# Patient Record
Sex: FEMALE | Race: AMERICAN INDIAN OR ALASKA NATIVE | NOT HISPANIC OR LATINO | URBAN - METROPOLITAN AREA
[De-identification: names, ages, dates, MRNs, and addresses within clinical notes are randomized per-mention and may not be internally consistent; named-entity substitution may affect disease eponyms.]

---

## 2019-10-03 ENCOUNTER — EMERGENCY (EMERGENCY)
Facility: HOSPITAL | Age: 19
LOS: 0 days | Discharge: HOME | End: 2019-10-03
Attending: EMERGENCY MEDICINE | Admitting: EMERGENCY MEDICINE
Payer: SUBSIDIZED

## 2019-10-03 VITALS
HEART RATE: 82 BPM | TEMPERATURE: 98 F | SYSTOLIC BLOOD PRESSURE: 130 MMHG | WEIGHT: 115.08 LBS | OXYGEN SATURATION: 100 % | DIASTOLIC BLOOD PRESSURE: 65 MMHG | RESPIRATION RATE: 18 BRPM

## 2019-10-03 DIAGNOSIS — H53.8 OTHER VISUAL DISTURBANCES: ICD-10-CM

## 2019-10-03 DIAGNOSIS — H57.11 OCULAR PAIN, RIGHT EYE: ICD-10-CM

## 2019-10-03 DIAGNOSIS — H57.10 OCULAR PAIN, UNSPECIFIED EYE: ICD-10-CM

## 2019-10-03 PROCEDURE — 99283 EMERGENCY DEPT VISIT LOW MDM: CPT

## 2019-10-03 PROCEDURE — 99053 MED SERV 10PM-8AM 24 HR FAC: CPT

## 2019-10-03 RX ORDER — OFLOXACIN 0.3 %
2 DROPS OPHTHALMIC (EYE)
Qty: 2 | Refills: 0
Start: 2019-10-03 | End: 2019-10-09

## 2019-10-03 NOTE — ED PROVIDER NOTE - OBJECTIVE STATEMENT
pt is a 18 yof w/ no pmhx here for R eye pain for the last week. pt states eye has gotten progressively more erythematous and painful. Last night, pt noticed purulent discharge so she came in. denies dizziness, n/v, abd pain, diarrhea

## 2019-10-03 NOTE — ED PROVIDER NOTE - NS ED ROS FT
Constitutional:  See HPI  Eyes:  +decreased vision, + R eye pain  ENMT: No neck pain or stiffness  Cardiac:  No chest pain  Respiratory:  No cough or respiratory distress.   GI:  No nausea, vomiting, diarrhea or abdominal pain.  :  No dysuria, frequency or burning.  MS:  No back pain.  Neuro:  No headache   Skin:  No skin rash  Except as documented in the HPI,  all other systems are negative

## 2019-10-03 NOTE — ED PROVIDER NOTE - CLINICAL SUMMARY MEDICAL DECISION MAKING FREE TEXT BOX
Imp: ?corneal ulcer vs abrasion. No foreign body.  A/P: Ofloxacin drops. Will f/u with ophthalmologist Dr. Cruz.

## 2019-10-03 NOTE — ED PROVIDER NOTE - PHYSICAL EXAMINATION
CONSTITUTIONAL: Well-developed; well-nourished; in no acute distress.   SKIN: warm, dry  HEAD: Normocephalic; atraumatic.  EYES: PERRL, EOMI, normal sclera and conjunctiva. vision 20/50 in R eye, 20/40 in L eye  ENT: No nasal discharge; airway clear.  NECK: Supple; non tender.  ABD: soft ntnd  EXT: Normal ROM.  No clubbing, cyanosis or edema.   LYMPH: No acute cervical adenopathy.  NEURO: Alert, oriented, grossly unremarkable  PSYCH: Cooperative, appropriate.

## 2019-10-03 NOTE — ED PEDIATRIC NURSE NOTE - NSIMPLEMENTINTERV_GEN_ALL_ED
Implemented All Universal Safety Interventions:  [place] to call system. Call bell, personal items and telephone within reach. Instruct patient to call for assistance. Room bathroom lighting operational. Non-slip footwear when patient is off stretcher. Physically safe environment: no spills, clutter or unnecessary equipment. Stretcher in lowest position, wheels locked, appropriate side rails in place.

## 2019-10-03 NOTE — ED PROVIDER NOTE - NSFOLLOWUPINSTRUCTIONS_ED_ALL_ED_FT
Eye Pain    WHAT YOU NEED TO KNOW:    Eye pain may be caused by a problem within your eye. A problem or condition in another body area can also cause pain that travels to your eye. Some causes of eye pain include dry eyes, inflammation, a sinus infection, or a cluster headache.    DISCHARGE INSTRUCTIONS:    Medicines: You may need any of the following:     Artificial tears are eyedrops that can help moisturize your eyes and relieve your pain. Ask your healthcare provider how often to use artificial tears.       NSAIDs, such as ibuprofen, help decrease swelling, pain, and fever. This medicine is available with or without a doctor's order. NSAIDs can cause stomach bleeding or kidney problems in certain people. If you take blood thinner medicine, always ask if NSAIDs are safe for you. Always read the medicine label and follow directions. Do not give these medicines to children under 6 months of age without direction from your child's healthcare provider.      Take your medicine as directed. Contact your healthcare provider if you think your medicine is not helping or if you have side effects. Tell him of her if you are allergic to any medicine. Keep a list of the medicines, vitamins, and herbs you take. Include the amounts, and when and why you take them. Bring the list or the pill bottles to follow-up visits. Carry your medicine list with you in case of an emergency.    Follow up with your healthcare provider as directed: You may be referred to an eye specialist. Write down your questions so you remember to ask them during your visits.    Contact your healthcare provider if:     You have a fever.       Your eye pain gets worse when you move your eyes.       You have questions or concerns about your condition or care.    Return to the emergency department if:     You have any vision loss.       You have sudden vision changes such as blurred vision, double vision, or seeing halos around lights.       You develop severe eye pain.          © Copyright [employer] 2019 All illustrations and images included in CareNotes are the copyrighted property of A.D.A.M., Inc. or [employer].

## 2019-10-03 NOTE — ED PEDIATRIC NURSE NOTE - OBJECTIVE STATEMENT
Patient present to ED with complains of right eye pain x 1 week with redness, swelling and purulent discharge, denies fever, chills, nausea, vomiting, and dizziness.

## 2019-10-03 NOTE — ED PEDIATRIC TRIAGE NOTE - AS O2 DELIVERY
Melissa Monge  [facility] [facility] [place] [address]           T [phone]               04/04/19    Barbara Diaz  [id number] [facility] [address]      To Whom It May Concern:      Pleases excuse patient from school the morning of 4/4/2019.    Thank you,        SIGNATURE:____________________________________________________                                                                 Leslie Wheatley NP               
room air

## 2019-10-03 NOTE — ED PROVIDER NOTE - PATIENT PORTAL LINK FT
You can access the FollowMyHealth Patient Portal offered by [facility] by registering at the following website: [email or web address]. By joining [employer]’s FollowMyHealth portal, you will also be able to view your health information using other applications (apps) compatible with our system.

## 2019-10-03 NOTE — ED PROVIDER NOTE - PROGRESS NOTE DETAILS
ATTENDING NOTE: 17 y/o female with no significant PMH, c/o pain to right eye x 1 week. No clear inciting event. States she always has eye pain but now it is worse. No blurry vision. No fever. No H/A, no neck pain. Has had vomiting but that preceded the eye pain by several weeks. No hx of trauma. Does wear contact lenses.  O/E: Constitutional: Non-toxic in appearance. Eyes: PERRL. EOMI. Globes intact. No visible foreign body. Lids inverted - no foreign body. Mild injection of conjunctivae. VA 20/50 right eye, 20/40 left eye. Slit lamp exam: no foreign body, anterior chamber quiet. + small area of fluorescein uptake at 6:00 position. IOP 8 in right eye, 8 in left eye. Neck: Supple, no meningeal signs. Neuro: No focal neurologic deficits.   Imp: ?corneal ulcer vs abrasion. No foreign body.  A/P: Ofloxacin drops. Will f/u with ophthalmologist Dr. Cruz. IOP: 8 in R eye, 8 L eye fluoroscein uptake in spot at 6 oclock

## 2019-10-03 NOTE — ED PROVIDER NOTE - CARE PROVIDER_API CALL
Donato Cruz)  Ophthalmology  [address] [place] [address]  Phone: [phone]  Fax: [phone]  Follow Up Time:

## 2021-06-25 ENCOUNTER — EMERGENCY (EMERGENCY)
Facility: HOSPITAL | Age: 21
LOS: 0 days | Discharge: HOME | End: 2021-06-26
Attending: STUDENT IN AN ORGANIZED HEALTH CARE EDUCATION/TRAINING PROGRAM | Admitting: STUDENT IN AN ORGANIZED HEALTH CARE EDUCATION/TRAINING PROGRAM
Payer: COMMERCIAL

## 2021-06-25 VITALS
SYSTOLIC BLOOD PRESSURE: 111 MMHG | RESPIRATION RATE: 17 BRPM | TEMPERATURE: 101 F | HEART RATE: 103 BPM | DIASTOLIC BLOOD PRESSURE: 61 MMHG | OXYGEN SATURATION: 100 %

## 2021-06-25 DIAGNOSIS — R50.9 FEVER, UNSPECIFIED: ICD-10-CM

## 2021-06-25 DIAGNOSIS — Z20.822 CONTACT WITH AND (SUSPECTED) EXPOSURE TO COVID-19: ICD-10-CM

## 2021-06-25 DIAGNOSIS — R11.2 NAUSEA WITH VOMITING, UNSPECIFIED: ICD-10-CM

## 2021-06-25 DIAGNOSIS — R53.1 WEAKNESS: ICD-10-CM

## 2021-06-25 DIAGNOSIS — E86.0 DEHYDRATION: ICD-10-CM

## 2021-06-25 DIAGNOSIS — M79.10 MYALGIA, UNSPECIFIED SITE: ICD-10-CM

## 2021-06-25 LAB
BASOPHILS # BLD AUTO: 0.02 K/UL — SIGNIFICANT CHANGE UP (ref 0–0.2)
BASOPHILS NFR BLD AUTO: 0.2 % — SIGNIFICANT CHANGE UP (ref 0–1)
EOSINOPHIL # BLD AUTO: 0.01 K/UL — SIGNIFICANT CHANGE UP (ref 0–0.7)
EOSINOPHIL NFR BLD AUTO: 0.1 % — SIGNIFICANT CHANGE UP (ref 0–8)
HCT VFR BLD CALC: 39.8 % — SIGNIFICANT CHANGE UP (ref 37–47)
HGB BLD-MCNC: 13.5 G/DL — SIGNIFICANT CHANGE UP (ref 12–16)
IMM GRANULOCYTES NFR BLD AUTO: 0.3 % — SIGNIFICANT CHANGE UP (ref 0.1–0.3)
LYMPHOCYTES # BLD AUTO: 1.09 K/UL — LOW (ref 1.2–3.4)
LYMPHOCYTES # BLD AUTO: 11.6 % — LOW (ref 20.5–51.1)
MCHC RBC-ENTMCNC: 27.8 PG — SIGNIFICANT CHANGE UP (ref 27–31)
MCHC RBC-ENTMCNC: 33.9 G/DL — SIGNIFICANT CHANGE UP (ref 32–37)
MCV RBC AUTO: 82.1 FL — SIGNIFICANT CHANGE UP (ref 81–99)
MONOCYTES # BLD AUTO: 0.98 K/UL — HIGH (ref 0.1–0.6)
MONOCYTES NFR BLD AUTO: 10.5 % — HIGH (ref 1.7–9.3)
NEUTROPHILS # BLD AUTO: 7.23 K/UL — HIGH (ref 1.4–6.5)
NEUTROPHILS NFR BLD AUTO: 77.3 % — HIGH (ref 42.2–75.2)
NRBC # BLD: 0 /100 WBCS — SIGNIFICANT CHANGE UP (ref 0–0)
PLATELET # BLD AUTO: 230 K/UL — SIGNIFICANT CHANGE UP (ref 130–400)
RBC # BLD: 4.85 M/UL — SIGNIFICANT CHANGE UP (ref 4.2–5.4)
RBC # FLD: 12.3 % — SIGNIFICANT CHANGE UP (ref 11.5–14.5)
WBC # BLD: 9.36 K/UL — SIGNIFICANT CHANGE UP (ref 4.8–10.8)
WBC # FLD AUTO: 9.36 K/UL — SIGNIFICANT CHANGE UP (ref 4.8–10.8)

## 2021-06-25 PROCEDURE — 99284 EMERGENCY DEPT VISIT MOD MDM: CPT

## 2021-06-25 RX ORDER — ACETAMINOPHEN 500 MG
975 TABLET ORAL ONCE
Refills: 0 | Status: COMPLETED | OUTPATIENT
Start: 2021-06-25 | End: 2021-06-25

## 2021-06-25 RX ORDER — ONDANSETRON 8 MG/1
4 TABLET, FILM COATED ORAL ONCE
Refills: 0 | Status: COMPLETED | OUTPATIENT
Start: 2021-06-25 | End: 2021-06-25

## 2021-06-25 RX ORDER — SODIUM CHLORIDE 9 MG/ML
1000 INJECTION INTRAMUSCULAR; INTRAVENOUS; SUBCUTANEOUS ONCE
Refills: 0 | Status: COMPLETED | OUTPATIENT
Start: 2021-06-25 | End: 2021-06-25

## 2021-06-25 RX ADMIN — ONDANSETRON 4 MILLIGRAM(S): 8 TABLET, FILM COATED ORAL at 23:09

## 2021-06-25 RX ADMIN — SODIUM CHLORIDE 1000 MILLILITER(S): 9 INJECTION INTRAMUSCULAR; INTRAVENOUS; SUBCUTANEOUS at 23:08

## 2021-06-25 RX ADMIN — Medication 975 MILLIGRAM(S): at 23:02

## 2021-06-26 VITALS
OXYGEN SATURATION: 100 % | DIASTOLIC BLOOD PRESSURE: 56 MMHG | HEART RATE: 91 BPM | RESPIRATION RATE: 18 BRPM | SYSTOLIC BLOOD PRESSURE: 118 MMHG | TEMPERATURE: 99 F

## 2021-06-26 LAB
ALBUMIN SERPL ELPH-MCNC: 4.9 G/DL — SIGNIFICANT CHANGE UP (ref 3.5–5.2)
ALP SERPL-CCNC: 68 U/L — SIGNIFICANT CHANGE UP (ref 30–115)
ALT FLD-CCNC: 13 U/L — SIGNIFICANT CHANGE UP (ref 0–41)
ANION GAP SERPL CALC-SCNC: 12 MMOL/L — SIGNIFICANT CHANGE UP (ref 7–14)
APPEARANCE UR: CLEAR — SIGNIFICANT CHANGE UP
AST SERPL-CCNC: 17 U/L — SIGNIFICANT CHANGE UP (ref 0–41)
BILIRUB SERPL-MCNC: 0.3 MG/DL — SIGNIFICANT CHANGE UP (ref 0.2–1.2)
BILIRUB UR-MCNC: NEGATIVE — SIGNIFICANT CHANGE UP
BUN SERPL-MCNC: 10 MG/DL — SIGNIFICANT CHANGE UP (ref 10–20)
CALCIUM SERPL-MCNC: 9.5 MG/DL — SIGNIFICANT CHANGE UP (ref 8.5–10.1)
CHLORIDE SERPL-SCNC: 98 MMOL/L — SIGNIFICANT CHANGE UP (ref 98–110)
CO2 SERPL-SCNC: 25 MMOL/L — SIGNIFICANT CHANGE UP (ref 17–32)
COLOR SPEC: YELLOW — SIGNIFICANT CHANGE UP
CREAT SERPL-MCNC: 0.6 MG/DL — LOW (ref 0.7–1.5)
DIFF PNL FLD: NEGATIVE — SIGNIFICANT CHANGE UP
GLUCOSE SERPL-MCNC: 87 MG/DL — SIGNIFICANT CHANGE UP (ref 70–99)
GLUCOSE UR QL: NEGATIVE — SIGNIFICANT CHANGE UP
HCG SERPL QL: NEGATIVE — SIGNIFICANT CHANGE UP
KETONES UR-MCNC: ABNORMAL
LACTATE SERPL-SCNC: 1.2 MMOL/L — SIGNIFICANT CHANGE UP (ref 0.7–2)
LEUKOCYTE ESTERASE UR-ACNC: NEGATIVE — SIGNIFICANT CHANGE UP
LIDOCAIN IGE QN: 12 U/L — SIGNIFICANT CHANGE UP (ref 7–60)
MAGNESIUM SERPL-MCNC: 1.9 MG/DL — SIGNIFICANT CHANGE UP (ref 1.8–2.4)
NITRITE UR-MCNC: NEGATIVE — SIGNIFICANT CHANGE UP
PH UR: 6.5 — SIGNIFICANT CHANGE UP (ref 5–8)
POTASSIUM SERPL-MCNC: 4 MMOL/L — SIGNIFICANT CHANGE UP (ref 3.5–5)
POTASSIUM SERPL-SCNC: 4 MMOL/L — SIGNIFICANT CHANGE UP (ref 3.5–5)
PROT SERPL-MCNC: 8 G/DL — SIGNIFICANT CHANGE UP (ref 6–8)
PROT UR-MCNC: SIGNIFICANT CHANGE UP
RAPID RVP RESULT: SIGNIFICANT CHANGE UP
SARS-COV-2 RNA SPEC QL NAA+PROBE: SIGNIFICANT CHANGE UP
SODIUM SERPL-SCNC: 135 MMOL/L — SIGNIFICANT CHANGE UP (ref 135–146)
SP GR SPEC: 1.02 — SIGNIFICANT CHANGE UP (ref 1.01–1.03)
UROBILINOGEN FLD QL: SIGNIFICANT CHANGE UP

## 2021-06-26 RX ORDER — ONDANSETRON 8 MG/1
1 TABLET, FILM COATED ORAL
Qty: 3 | Refills: 0
Start: 2021-06-26

## 2021-06-26 NOTE — ED PROVIDER NOTE - OBJECTIVE STATEMENT
20y F no ppmh presents for eval of weakness. Pt has fever x1 day with associated body aches, fever, nbnb n/v, mild relief from motrin, no aggravating factors. Denies ha, cp, sob, cough, abd pain, numbness, dysuria, hematuria

## 2021-06-26 NOTE — ED PROVIDER NOTE - CLINICAL SUMMARY MEDICAL DECISION MAKING FREE TEXT BOX
Previously healthy 20F p/w viral Sx, myalgias, n/v, fever x1 day. On arrival temp 100.9 oral, , pt not ill appearing, Gen - NAD, Head - NCAT, Pharynx - clear, MMM, Heart - RRR, no m/g/r, Lungs - CTAB, no w/c/r, Abdomen - soft, NT, ND, Skin - No rash, Extremities - FROM, no edema, erythema, ecchymosis, brisk cap refill, Neuro - A&O x3, equal strength and sensation, non-focal exam. Labs reviewed, IVF and antipyretics given. On reassessment pt well appearing, tolerated PO and feels well enough for d/c I have fully discussed the medical management and delivery of care with the patient. I have discussed any available labs, imaging and treatment options with the patient. All Questions answered at the bedside and printed copies of all results provided and recommended to review with PCP. Patient confirms understanding and has been given detailed return precautions. Patient instructed to return to the ED should symptoms persist or worsen. Patient has demonstrated capacity and has verbalized understanding. Patient is well appearing upon discharge, ambulatory with a steady gait.

## 2021-06-26 NOTE — ED PROVIDER NOTE - NS ED ROS FT
Constitutional: (+) fever, (+) fatigue  Eyes/ENT: (-) blurry vision, (-) epistaxis  Cardiovascular: (-) chest pain, (-) syncope  Respiratory: (-) cough, (-) shortness of breath  Gastrointestinal: (+) vomiting, (-) diarrhea  : (-) dysuria, (-) hematuria  Musculoskeletal: (-) neck pain, (-) back pain, (-) joint pain  Integumentary: (-) rash, (-) edema  Neurological: (-) headache, (-) altered mental status  Allergic/Immunologic: (-) pruritus

## 2021-06-26 NOTE — ED PROVIDER NOTE - PHYSICAL EXAMINATION
CONST: NAD  EYES: Sclera and conjunctiva clear.   ENT: Oropharynx mild erythema. No abscess or swelling. Uvula midline. No nasal discharge.   NECK: Non-tender, no meningeal signs. normal ROM. supple   CARD: S1 S2; No jvd  RESP: Equal BS B/L, No wheezes, rhonchi or rales. No distress  GI: Soft, non-tender, non-distended. no cva tenderness. normal BS  MS: Normal ROM in all extremities. pulses 2 +. no calf tenderness or swelling  SKIN: Warm, dry, no acute rashes. Good turgor  NEURO: A&Ox3, No focal deficits. Strength 5/5 with no sensory deficits. Steady gait.

## 2021-06-26 NOTE — ED PROVIDER NOTE - PATIENT PORTAL LINK FT
You can access the FollowMyHealth Patient Portal offered by [facility] by registering at the following website: [email or web address]. By joining [employer]’s FollowMyHealth portal, you will also be able to view your health information using other applications (apps) compatible with our system.

## 2021-06-26 NOTE — ED PROVIDER NOTE - PMH
No pertinent past medical history   No pertinent past medical history     <<----- Click to add NO pertinent Past Medical History

## 2021-06-27 LAB
CULTURE RESULTS: SIGNIFICANT CHANGE UP
SPECIMEN SOURCE: SIGNIFICANT CHANGE UP

## 2022-10-05 NOTE — ED ADULT NURSE NOTE - NS ED NURSE DC INFO COMPLEXITY
What Is The Reason For Today's Visit?: History of Melanoma Year Excised?: 2016 [place] Depth?: Unknown Simple: Patient demonstrates quick and easy understanding

## 2022-11-04 ENCOUNTER — INPATIENT (INPATIENT)
Facility: HOSPITAL | Age: 22
LOS: 0 days | Discharge: HOME | End: 2022-11-05
Attending: INTERNAL MEDICINE | Admitting: INTERNAL MEDICINE
Payer: COMMERCIAL

## 2022-11-04 VITALS
OXYGEN SATURATION: 99 % | SYSTOLIC BLOOD PRESSURE: 120 MMHG | TEMPERATURE: 98 F | RESPIRATION RATE: 18 BRPM | WEIGHT: 119.93 LBS | HEART RATE: 107 BPM | DIASTOLIC BLOOD PRESSURE: 78 MMHG

## 2022-11-04 LAB
ALBUMIN SERPL ELPH-MCNC: 4.7 G/DL — SIGNIFICANT CHANGE UP (ref 3.5–5.2)
ALP SERPL-CCNC: 63 U/L — SIGNIFICANT CHANGE UP (ref 30–115)
ALT FLD-CCNC: 12 U/L — SIGNIFICANT CHANGE UP (ref 0–41)
ANION GAP SERPL CALC-SCNC: 12 MMOL/L — SIGNIFICANT CHANGE UP (ref 7–14)
AST SERPL-CCNC: 18 U/L — SIGNIFICANT CHANGE UP (ref 0–41)
BASOPHILS # BLD AUTO: 0.03 K/UL — SIGNIFICANT CHANGE UP (ref 0–0.2)
BASOPHILS NFR BLD AUTO: 0.3 % — SIGNIFICANT CHANGE UP (ref 0–1)
BILIRUB SERPL-MCNC: <0.2 MG/DL — SIGNIFICANT CHANGE UP (ref 0.2–1.2)
BUN SERPL-MCNC: 16 MG/DL — SIGNIFICANT CHANGE UP (ref 10–20)
CALCIUM SERPL-MCNC: 9 MG/DL — SIGNIFICANT CHANGE UP (ref 8.4–10.5)
CHLORIDE SERPL-SCNC: 100 MMOL/L — SIGNIFICANT CHANGE UP (ref 98–110)
CO2 SERPL-SCNC: 23 MMOL/L — SIGNIFICANT CHANGE UP (ref 17–32)
CREAT SERPL-MCNC: 0.5 MG/DL — LOW (ref 0.7–1.5)
EGFR: 136 ML/MIN/1.73M2 — SIGNIFICANT CHANGE UP
EOSINOPHIL # BLD AUTO: 0.09 K/UL — SIGNIFICANT CHANGE UP (ref 0–0.7)
EOSINOPHIL NFR BLD AUTO: 0.9 % — SIGNIFICANT CHANGE UP (ref 0–8)
GLUCOSE SERPL-MCNC: 119 MG/DL — HIGH (ref 70–99)
HCG SERPL QL: NEGATIVE — SIGNIFICANT CHANGE UP
HCT VFR BLD CALC: 36.9 % — LOW (ref 37–47)
HGB BLD-MCNC: 12.7 G/DL — SIGNIFICANT CHANGE UP (ref 12–16)
IMM GRANULOCYTES NFR BLD AUTO: 0.3 % — SIGNIFICANT CHANGE UP (ref 0.1–0.3)
LACTATE SERPL-SCNC: 1.8 MMOL/L — SIGNIFICANT CHANGE UP (ref 0.7–2)
LIDOCAIN IGE QN: 24 U/L — SIGNIFICANT CHANGE UP (ref 7–60)
LYMPHOCYTES # BLD AUTO: 3.54 K/UL — HIGH (ref 1.2–3.4)
LYMPHOCYTES # BLD AUTO: 34.1 % — SIGNIFICANT CHANGE UP (ref 20.5–51.1)
MCHC RBC-ENTMCNC: 28.6 PG — SIGNIFICANT CHANGE UP (ref 27–31)
MCHC RBC-ENTMCNC: 34.4 G/DL — SIGNIFICANT CHANGE UP (ref 32–37)
MCV RBC AUTO: 83.1 FL — SIGNIFICANT CHANGE UP (ref 81–99)
MONOCYTES # BLD AUTO: 0.63 K/UL — HIGH (ref 0.1–0.6)
MONOCYTES NFR BLD AUTO: 6.1 % — SIGNIFICANT CHANGE UP (ref 1.7–9.3)
NEUTROPHILS # BLD AUTO: 6.06 K/UL — SIGNIFICANT CHANGE UP (ref 1.4–6.5)
NEUTROPHILS NFR BLD AUTO: 58.3 % — SIGNIFICANT CHANGE UP (ref 42.2–75.2)
NRBC # BLD: 0 /100 WBCS — SIGNIFICANT CHANGE UP (ref 0–0)
PLATELET # BLD AUTO: 274 K/UL — SIGNIFICANT CHANGE UP (ref 130–400)
POTASSIUM SERPL-MCNC: 4.5 MMOL/L — SIGNIFICANT CHANGE UP (ref 3.5–5)
POTASSIUM SERPL-SCNC: 4.5 MMOL/L — SIGNIFICANT CHANGE UP (ref 3.5–5)
PROT SERPL-MCNC: 7.4 G/DL — SIGNIFICANT CHANGE UP (ref 6–8)
RBC # BLD: 4.44 M/UL — SIGNIFICANT CHANGE UP (ref 4.2–5.4)
RBC # FLD: 12.3 % — SIGNIFICANT CHANGE UP (ref 11.5–14.5)
SARS-COV-2 RNA SPEC QL NAA+PROBE: SIGNIFICANT CHANGE UP
SODIUM SERPL-SCNC: 135 MMOL/L — SIGNIFICANT CHANGE UP (ref 135–146)
WBC # BLD: 10.38 K/UL — SIGNIFICANT CHANGE UP (ref 4.8–10.8)
WBC # FLD AUTO: 10.38 K/UL — SIGNIFICANT CHANGE UP (ref 4.8–10.8)

## 2022-11-04 PROCEDURE — 99284 EMERGENCY DEPT VISIT MOD MDM: CPT

## 2022-11-04 PROCEDURE — 99282 EMERGENCY DEPT VISIT SF MDM: CPT

## 2022-11-04 PROCEDURE — 74177 CT ABD & PELVIS W/CONTRAST: CPT | Mod: 26,MA

## 2022-11-04 RX ORDER — SODIUM CHLORIDE 9 MG/ML
1000 INJECTION INTRAMUSCULAR; INTRAVENOUS; SUBCUTANEOUS ONCE
Refills: 0 | Status: COMPLETED | OUTPATIENT
Start: 2022-11-04 | End: 2022-11-04

## 2022-11-04 RX ORDER — DIATRIZOATE MEGLUMINE 180 MG/ML
30 INJECTION, SOLUTION INTRAVESICAL ONCE
Refills: 0 | Status: COMPLETED | OUTPATIENT
Start: 2022-11-04 | End: 2022-11-04

## 2022-11-04 RX ORDER — MORPHINE SULFATE 50 MG/1
4 CAPSULE, EXTENDED RELEASE ORAL ONCE
Refills: 0 | Status: DISCONTINUED | OUTPATIENT
Start: 2022-11-04 | End: 2022-11-04

## 2022-11-04 RX ORDER — ONDANSETRON 8 MG/1
4 TABLET, FILM COATED ORAL ONCE
Refills: 0 | Status: COMPLETED | OUTPATIENT
Start: 2022-11-04 | End: 2022-11-04

## 2022-11-04 RX ADMIN — DIATRIZOATE MEGLUMINE 30 MILLILITER(S): 180 INJECTION, SOLUTION INTRAVESICAL at 21:05

## 2022-11-04 RX ADMIN — MORPHINE SULFATE 4 MILLIGRAM(S): 50 CAPSULE, EXTENDED RELEASE ORAL at 21:05

## 2022-11-04 RX ADMIN — SODIUM CHLORIDE 2000 MILLILITER(S): 9 INJECTION INTRAMUSCULAR; INTRAVENOUS; SUBCUTANEOUS at 21:06

## 2022-11-04 RX ADMIN — ONDANSETRON 4 MILLIGRAM(S): 8 TABLET, FILM COATED ORAL at 21:06

## 2022-11-04 NOTE — ED PROVIDER NOTE - PROGRESS NOTE DETAILS
Consulted surgery on call, pending their evaluation and care. Pt has been evaluated by surgery and cleared. Pt has been reevaluated and still in pain after morphine. Will admitted for pain control.  Pt is aware of the plan an agrees. Pt has been endorsed to MAR.

## 2022-11-04 NOTE — ED PROVIDER NOTE - CLINICAL SUMMARY MEDICAL DECISION MAKING FREE TEXT BOX
Patient remained hemodynamically stable in ED. Patient abdominal pain continued, will admit to medicine for serial abd exams, symptomatic treatment.   Patient remained hemodynamically stable during the course of ED stay. Discussed with patient about the results of the diagnostic studies. Discussed with admitting physician and MAR, patient is admitted to Medicine for further evaluation and care.

## 2022-11-04 NOTE — ED PROVIDER NOTE - OBJECTIVE STATEMENT
21-year-old female with past medical history of umbilical hernia who presents with periumbilical abdominal pain.  Reports that symptoms started 4 days ago; pain is constant, sharp, and there is no alleviating or worsening factor. Also endorses fever of 101 and patient was taken around 5:30 PM tonight.  Also endorses nausea, but no vomiting.  Denies chest pain, shortness breath, vomiting, hematuria, dysuria, and change in bowel movement.

## 2022-11-04 NOTE — ED PROVIDER NOTE - PROGRESS NOTE
2022    TELEHEALTH EVALUATION -- Audio/Visual (During [id number] public health emergency)    HPI:    Brandon Mariscal (:  2000) has requested an audio/video evaluation for the following concern(s): She has been having sore throat ongoing since 3 days and its been difficult to swallow. Mom notices blisters over her tonsils. Denies any concerns of COVID as they tested negative  She has been only taking fluids. She has nasal congestion. 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 But she has been managing it    She is on birth control and has been following up with endocrinologist at [facility] for hypogonadism. Review of Systems:  Gen:  Denies fever, chills, headaches. No weight loss  HEENT: As mentioned above  CV:  Denies chest pain or tightness, palpitations. Pulm:  Denies shortness of breath, cough. Abd:  Denies abdominal pain, change in bowel habits. Prior to Visit Medications    Medication Sig Taking?  Authorizing Provider   amoxicillin (AMOXIL) 400 MG/5ML suspension Take 6.3 mLs by mouth 2 times daily for 10 days Yes Behzad Naqvi MD   omeprazole (PRILOSEC) 10 MG delayed release capsule TAKE 1 CAPSULE ONE TIME EVERY NIGHT Yes Behzad Naqvi MD   escitalopram (LEXAPRO) 20 MG tablet Take 20 mg by mouth daily Yes Historical Provider, MD   escitalopram (LEXAPRO) 5 MG tablet Take 5 mg by mouth daily Yes Historical Provider, MD   escitalopram (LEXAPRO) 10 MG tablet Take 10 mg by mouth daily Yes Historical Provider, MD   hydrOXYzine (ATARAX) 10 MG tablet Take 10 mg by mouth 3 times daily as needed for Anxiety Unsure of dosage  prn Yes Historical Provider, MD   levothyroxine (SYNTHROID) 137 MCG tablet  Yes Behzad Naqvi MD   fluticasone (FLONASE) 50 MCG/ACT nasal spray 2 sprays by Each Nostril route daily Yes Behzad Naqvi MD   polyethylene glycol (GLYCOLAX) 17 GM/SCOOP powder Take 26 g by mouth daily Yes Behzad Naqvi MD Syringe/Needle, Disp, (SYRINGE 3CC/22GX1\") 22G X 1\" 3 ML MISC FOR MIXING DILUENT AND GROWTH HORMONE MEDICATION ONLY Yes Chet Smallwood MD   magnesium hydroxide (MILK OF MAGNESIA) 400 MG/5ML suspension Take 30 mLs by mouth daily as needed for Constipation Yes Chet Smallwood MD   Cholecalciferol (VITAMIN D3) 50 MCG (2000 UT) TABS Take 2,000 Units by mouth Yes Historical Provider, MD   acetaminophen (TYLENOL) 325 MG tablet Take 650 mg by mouth  Yes Historical Provider, MD   estradiol (VIVELLE) 0.1 MG/24HR APPLY ONE PATCH TO SKIN TWICE A WEEK . DO NOT CUT THE PATCH Yes Historical Provider, MD   fluticasone (FLONASE) 50 MCG/ACT nasal spray PLACE TWO SPRAYS IN EACH NOSTRIL ONCE DAILY Yes Historical Provider, MD   Insulin Syringe-Needle U-100 31G X 5/16\" 0.3 ML MISC Use as directed to administer growth hormone medication daily Yes Historical Provider, MD   Somatropin 5.8 MG SOLR INJECT 0.4MG SUBCUTANEOUSLY ONCE DAILY. DISCARD VIAL 21 DAYS AFTER RECONSTITUTION. REFRIGERATE. Yes Historical Provider, MD       No past medical history on file. No past surgical history on file. Family History   Problem Relation Age of Onset    Other Mother 39        ankylosingspondylitis       Allergies   Allergen Reactions    Azithromycin Diarrhea    Mosquito (Culex Pipiens) Allergy Skin Test      MOSQUITO BITES - Welts       Social History     Tobacco Use    Smoking status: Smoker, Current Status Unknown    Smokeless tobacco: Current    Tobacco comments:     vape stick   Vaping Use    Vaping Use: Every day   Substance Use Topics    Alcohol use: Yes    Drug use: Never          PHYSICAL EXAMINATION:  Vital Signs: (As obtained by patient/caregiver or practitioner observation)  There were no vitals taken for this visit.   Patient-Reported Vitals 9/29/2022   Patient-Reported Weight 179   Patient-Reported Height 4'11\"   Patient-Reported Systolic -   Patient-Reported Diastolic -        Respiratory rate appears Stable. Improved.

## 2022-11-04 NOTE — ED PROVIDER NOTE - PHYSICAL EXAMINATION
CONSTITUTIONAL: in no apparent distress.   HEAD: Normocephalic; atraumatic.   ENT: Hearing is intact with good acuity to spoken voice. Patient is speaking clearly, not muffled and airway is intact.   RESPIRATORY: No signs of respiratory distress. Lung sounds are clear in all lobes bilaterally without rales, rhonchi, or wheezes.  CARDIOVASCULAR: Regular rate and rhythm.   GI: tender to palpation in general abd area. Abdomen is soft, and without distention. Bowel sounds are present and normoactive in all four quadrants. No masses are noted.   BACK: No evidence of trauma or deformity. No CVA tenderness bilaterally. Normal ROM.   NEURO: A & O x 3. Normal speech.   PSYCHOLOGICAL: Appropriate mood and affect. Good judgement and insight.

## 2022-11-04 NOTE — ED PROVIDER NOTE - ATTENDING APP SHARED VISIT CONTRIBUTION OF CARE
Patient is c/o abd pain, generalized abd pain, most severe in the periumbilical area. Denies travel, no sick contacts. +nausea, +fever, and unable to eat/drink anything due to pain.   Vitals reviewed.   Patient is awake, alert, answering questions appropriately, appears uncomfortable, but not in distress.  Lungs: CTA, no wheezing, no crackles.  Abd: +BS, +generalized moderate to severe tenderness, ND, soft, no rebound, no guarding. No hernias noted.   CNS: awake, alert, o x 3, no focal neurologic deficits.  A/P: Abd pain with fever/nausea,   Patient took OTC antipyretics before coming to ED.   labs, IVF,   CT, symptomatic treatment,   reevaluation.
need for outpatient follow-up/lab results/return to ED if symptoms worsen, persist or questions arise/radiology results

## 2022-11-04 NOTE — ED PROVIDER NOTE - NS ED ROS FT
Constitutional: + fever. Negative for chills, and fatigue.  HENT: Negative for headache  Cardiovascular: Negative for chest pain, and palpitation.  Respiratory: Negative for SOB, wheezing, cough and sputum production.  Gastrointestinal: + nausea and abd pain. Negative for vomiting, constipation, diarrhea, hematochezia, and melena.  Genitourinary: Negative for flank pain, dysuria, frequency, and hematuria.  Hematological: Does not bruise/bleed easily.

## 2022-11-04 NOTE — ED ADULT TRIAGE NOTE - GLASGOW COMA SCALE: BEST MOTOR RESPONSE, MLM
68-year-old woman with documented PPH of Bipolar 2 disorder (though questionable if hx is consistent with ahsan/hypomania), 2 prior suicide attempts (via OD), 2 psych admissions (1987, 1992), no h/o substance abuse, recently switched outpatient providers after her psychiatrist of 30 years (Dr. Kong) retired in late 2021, now transferred to [facility] from [facility] for management of depression after being stabilized for AMS after patient overdosed on 90 tabs of Ativan and several tabs of Lamictal and Seroquel iso 1 year of low mood, anxiety, feelings of worthlessness, hopelessness, and overwhelming loneliness. Patient has had ECT in the past, aim is to restart ECT during current hospitalization. Ms. Salazar appears to be unhappy with her new outpatient treatment team, idealizing Dr. Kong and describing her new psychiatrist and therapist as dismissive and uncaring. Ms. Salazar was tearful while sharing her story but expressed a sense of hope that with ECT she will once again feel like herself again.    Ms Salazar lives alone, she is , has no children, and is retired . She still volunteers sometimes in school and identifies her brother as her main source of support.    Pt now s/p 6 ECT sessions (3/18, 3/21, 3/25, 3/28, 3/30, 4/4). Next ECT schdeduled for this Friday 4/8. Remains adherent with medications and denies side effects. Continues to express anxiety about the future but endorses some improvement in hopelessness and negative self-image as patient engages more in psychotherapy. Now discussing future plans (returning home, selling house, looking at assisted living). Continues to seek reassurance frequently from all team members. Tolerating decrease in bedtime benzo dose (decreased 4/1). Planning for family meeting with brother on Thursday.     1. Legal: 9.27 admission  2. Safety: routine observation, pt denies SI/HI/I/P  3. Psychiatric:   -ECT Mon/Fri (2x per week to limit cognitive side effects)  -Continue Lamictal 150mg QHS, hold Lamictal the night before ECT (hold Sun and Thurs evenings)  -Continue Seroquel 200mg QHS  -Continue Ativan taper (last decreased 4/1), Ativan 0.5mg at 12PM, Ativan 0.25mg qhs  -sertraline 150mg, titrate as tolerated  -pantoprazole 6AM Mon/Fri prior to ECT to prevent nausea  4. Medical:   -Continue Synthroid 25mg daily, senna qhs, ASA 81mg daily  -Pt with elevation of TSH to 7.00 (up from 4.05) and bump in LFTs during medical hospitalization; plan to recheck with routine labs  -nicotine patch and PRN nicotine gum for cravings  -constipation, senna qhs, PRN MOM, PRN dulcolax  -monitor BP, pt with low-normal BPs, episode of symptomatic low BP during this admission    3/19 Depressed, tolerated ECT, cont ECT 3/21 Cont other meds  3/20 Depressed anxious, no SI. Cont ECT will communicate above to psychologist  3/21: Patient continues to be mostly withdrawn. Continues to present overly catastrophic and negative thinking. Case discussed with rest of treatment team. Will continue ECT 3x/ week. Will continue titrating Sertraline and tapering Ativan as tolerated.    3/23: Hold ECT for hypotension, pt now with normal vitals.   3/24: Vitals stable. Patient agreeable to discontinue AM ativan dose to continue benzo taper.   3/25: 3rd ECT session  3/28: 4th ECT session, mild disorientation and nausea after procedure; increase sertraline to 150mg  3/30: 5th ECT session, endorses confusion and nausea after procedure  3/31: anxious about ECT side effects, will skip tx tomorrow  4/1: decrease bedtime ativan dose  4/2 Improving with ECT, next 4/4 Will add laxatives, cont to reduce Ativan  4/3 Improved, denies SI. Cont ECT will add Dulcolax dose today can use Dulcolax supp today too. Patient may have used sig  laxatives at home  4/4 6th ECT session, some confusion afterwards  4/5 stable, still anxious about the future but more hopeful, making future plans  4/6: Pt endorses sustained gains in terms of mood and anxiety. Agreed to discontinue nighttime ativan. ECT scheduled for tomorrow morning.   4/8: Pt presents sustained gains in terms of her mood. Utilizing skills learned in CBT to reframe her thoughts. Discharge projected for next week.   4/11: Pt reports sustained gains in terms of mood and anxiety. Will continue ativan taper.  (M6) obeys commands

## 2022-11-05 ENCOUNTER — TRANSCRIPTION ENCOUNTER (OUTPATIENT)
Age: 22
End: 2022-11-05

## 2022-11-05 VITALS
RESPIRATION RATE: 17 BRPM | OXYGEN SATURATION: 97 % | DIASTOLIC BLOOD PRESSURE: 62 MMHG | TEMPERATURE: 98 F | HEART RATE: 80 BPM

## 2022-11-05 LAB
APPEARANCE UR: CLEAR — SIGNIFICANT CHANGE UP
BILIRUB UR-MCNC: NEGATIVE — SIGNIFICANT CHANGE UP
COLOR SPEC: SIGNIFICANT CHANGE UP
DIFF PNL FLD: NEGATIVE — SIGNIFICANT CHANGE UP
GLUCOSE UR QL: NEGATIVE — SIGNIFICANT CHANGE UP
KETONES UR-MCNC: NEGATIVE — SIGNIFICANT CHANGE UP
LEUKOCYTE ESTERASE UR-ACNC: NEGATIVE — SIGNIFICANT CHANGE UP
NITRITE UR-MCNC: NEGATIVE — SIGNIFICANT CHANGE UP
PH UR: 7 — SIGNIFICANT CHANGE UP (ref 5–8)
PROT UR-MCNC: SIGNIFICANT CHANGE UP
SP GR SPEC: >1.05 (ref 1.01–1.03)
UROBILINOGEN FLD QL: SIGNIFICANT CHANGE UP

## 2022-11-05 PROCEDURE — 99222 1ST HOSP IP/OBS MODERATE 55: CPT

## 2022-11-05 RX ORDER — ACETAMINOPHEN 500 MG
1 TABLET ORAL
Qty: 0 | Refills: 0 | DISCHARGE

## 2022-11-05 RX ORDER — KETOROLAC TROMETHAMINE 30 MG/ML
15 SYRINGE (ML) INJECTION ONCE
Refills: 0 | Status: DISCONTINUED | OUTPATIENT
Start: 2022-11-05 | End: 2022-11-05

## 2022-11-05 RX ORDER — FAMOTIDINE 10 MG/ML
20 INJECTION INTRAVENOUS ONCE
Refills: 0 | Status: COMPLETED | OUTPATIENT
Start: 2022-11-05 | End: 2022-11-05

## 2022-11-05 RX ORDER — PANTOPRAZOLE SODIUM 20 MG/1
1 TABLET, DELAYED RELEASE ORAL
Qty: 0 | Refills: 0 | DISCHARGE

## 2022-11-05 RX ADMIN — Medication 15 MILLIGRAM(S): at 02:37

## 2022-11-05 RX ADMIN — FAMOTIDINE 104 MILLIGRAM(S): 10 INJECTION INTRAVENOUS at 02:36

## 2022-11-05 NOTE — H&P ADULT - ATTENDING COMMENTS
PHYSICAL EXAM:  General Appearance: NAD, normal for age and gender. 	  Neck: Normal JVP, no bruit.   Eyes: Conjunctiva clear, Extra Ocular muscles intact. No scleral icterus.  ENMT: Moist oral mucosa. No oral lesion.  Cardiovascular: Regular rate and rhythm S1 S2, No JVD, No murmurs.  Respiratory: Lungs clear to auscultation. No wheezes, rales or rhonchi.  Psychiatry: Alert and oriented x 3, Mood & affect appropriate.  Gastrointestinal:  Soft, Non-distended. [place]-umbilical tenderness. No erythema or drainage from umbilicus.  Neurologic: Non-focal deficits.  Musculoskeletal/ extremities: Normal range of motion, No clubbing, cyanosis or edema.  Vascular: Peripheral pulses palpable bilaterally.  Skin/Integumen: No rashes, No ecchymoses, No cyanosis.    # Paraumbilical pain and tenderness likely secondary to mild omphalitis   - In ED, she was evaluated by Surgery team, was noted to have purulent material and debris expressed upon deep probing of the umbilicus, likely 2/2 omphalitis.  - she remained HD stable with no evidence of Leukocytosis or fever  - CT Abd/Pelvis unremarkable  - pt requesting to be dc home if possible  - recommended patient cleanse the umbilicus with saline or hydrogen peroxide, and keep the umbilicus clean and dry  - will d/c home on 7 day course of Augmentin and to follow surgery recommendation regarding cleaning with hydrogen peroxide    # h/o Gastritis  # h/o Irritable bowel syndrome  - c/w PPI and Dicyclomine    # DVT prophylaxis  - encourage ambulation

## 2022-11-05 NOTE — CONSULT NOTE ADULT - ATTENDING COMMENTS
Patient seen and examined with surgery team  in ED chair and discussed management plans.   LOcal inflammation with omphalitis  ct negative umbilicus cleaned and instrucrted patient. Out patient followup

## 2022-11-05 NOTE — H&P ADULT - NSHPLABSRESULTS_GEN_ALL_CORE
12.7   10.38 )-----------( 274      ( 04 Nov 2022 20:19 )             36.9       11-04    135  |  100  |  16  ----------------------------<  119<H>  4.5   |  23  |  0.5<L>    Ca    9.0      04 Nov 2022 20:19    TPro  7.4  /  Alb  4.7  /  TBili  <0.2  /  DBili  x   /  AST  18  /  ALT  12  /  AlkPhos  63  11-04              Urinalysis Basic - ( 05 Nov 2022 01:00 )    Color: Light Yellow / Appearance: Clear / SG: >1.050 / pH: x  Gluc: x / Ketone: Negative  / Bili: Negative / Urobili: <2 mg/dL   Blood: x / Protein: Trace / Nitrite: Negative   Leuk Esterase: Negative / RBC: x / WBC x   Sq Epi: x / Non Sq Epi: x / Bacteria: x            Lactate Trend  11-04 @ 20:19 Lactate:1.8             CAPILLARY BLOOD GLUCOSE 12.7   10.38 )-----------( 274      ( 04 Nov 2022 20:19 )             36.9       11-04    135  |  100  |  16  ----------------------------<  119<H>  4.5   |  23  |  0.5<L>    Ca    9.0      04 Nov 2022 20:19    TPro  7.4  /  Alb  4.7  /  TBili  <0.2  /  DBili  x   /  AST  18  /  ALT  12  /  AlkPhos  63  11-04        Urinalysis Basic - ( 05 Nov 2022 01:00 )    Color: Light Yellow / Appearance: Clear / SG: >1.050 / pH: x  Gluc: x / Ketone: Negative  / Bili: Negative / Urobili: <2 mg/dL   Blood: x / Protein: Trace / Nitrite: Negative   Leuk Esterase: Negative / RBC: x / WBC x   Sq Epi: x / Non Sq Epi: x / Bacteria: x      Lactate Trend  11-04 @ 20:19 Lactate:1.8     < from: CT Abdomen and Pelvis w/ Oral Cont and w/ IV Cont (11.04.22 @ 23:29) >    IMPRESSION:  1.  Fecalized distal ileum without definite evidence for bowel   obstruction, likely reflecting slow bowel transit.  2.  Appendix is unremarkable.

## 2022-11-05 NOTE — CONSULT NOTE ADULT - ASSESSMENT
ASSESSMENT:  21yF w/ PMHx of  irritable bowel syndrome, ?hernia and gastritis who presented with complaints of epigastric and umbilical pain since yesterday associated with constipation for 2 days. Also of note, on interview, she endorsed 2-3 episodes of emesis per day for the past 2-3 months for which she has been seen and evaluated by an outpatient gastroenterologist. Surgery was consulted to rule out possible umbilical hernia/evaluate abdominal pain.    On physical exam, patient was noted to have purulent material and debris expressed upon deep probing of the umbilicus, likely omphalitis. Further physical exam findings, imaging findings, and labs as documented above.     PLAN:  -No evidence of umbilical hernia or acute surgical pathology on physical exam or imaging  -No acute surgical intervention at this time  -Recommended patient cleanse the umbilicus with Q-tips and saline or hydrogen peroxide, and keep the umbilicus clean and dry  -Dispo per ED     Above plan discussed with Attending Surgeon Dr. Bain, patient, and Primary team  11-05-22 @ 03:54     ASSESSMENT:  21yF w/ PMHx of  irritable bowel syndrome, ?hernia and gastritis who presented with complaints of epigastric and umbilical pain since yesterday associated with constipation for 2 days. Also of note, on interview, she endorsed 2-3 episodes of emesis per day for the past 2-3 months for which she has been seen and evaluated by an outpatient gastroenterologist. Surgery was consulted to rule out possible umbilical hernia/evaluate abdominal pain.    On physical exam, patient was noted to have purulent material and debris expressed upon deep probing of the umbilicus, likely omphalitis. Further physical exam findings, imaging findings, and labs as documented above.     PLAN:  -No evidence of umbilical hernia or acute surgical pathology on physical exam or imaging  -No acute surgical intervention at this time  -Patient seen and evaluated at bedside with Dr. Bain, umbilicus contained purulent debris that was extracted with long q tips  -Recommended patient cleanse the umbilicus with Q-tips and saline or hydrogen peroxide, and keep the umbilicus clean and dry, no further intervention needed   -Dispo per ED     Above plan discussed with Attending Surgeon Dr. Bain, patient, and Primary team  11-05-22 @ 03:54

## 2022-11-05 NOTE — DISCHARGE NOTE PROVIDER - NSDCCPCAREPLAN_GEN_ALL_CORE_FT
PRINCIPAL DISCHARGE DIAGNOSIS  Diagnosis: Acquired omphalitis  Assessment and Plan of Treatment: You came to the hospital for evaluation of abdominal pain around your umbilius, You had CT scan of your abdomen done which was Negative for any acute pathology   You were evaluated by Surgery team and you were found to have   purulent material and debris expressed upon deep probing of the umbilicus, likely due to omphalitis (infection of your umbillicus) possibly due to comlications of naval piercing  CT Abdomen and Pelvis w/ Oral Cont and w/ IV Cont  1.  Fecalized distal ileum without definite evidence for bowel   obstruction, likely reflecting slow bowel transit.  2.  Appendix is unremarkable.  Please complete your course of  Oral Antibiotics as prescribed for 7 days   Please seek medical care if she develops fever/chills/drainge at the site  You are Recommended to cleane the umbilicus with Q-tips and saline or hydrogen peroxide, and keep the umbilicus clean and dry        SECONDARY DISCHARGE DIAGNOSES  Diagnosis: IBS (irritable bowel syndrome)  Assessment and Plan of Treatment: Please continue to take your home medications and follow up with Gasteroenterologist

## 2022-11-05 NOTE — DISCHARGE NOTE PROVIDER - CARE PROVIDER_API CALL
[facility])  Internal Medicine  [address] [place] [address], [address] [place] [address]  Phone: [phone]  Fax: [phone]  Follow Up Time: 1 week

## 2022-11-05 NOTE — DISCHARGE NOTE PROVIDER - ATTENDING DISCHARGE PHYSICAL EXAMINATION:
PHYSICAL EXAM:  General Appearance: NAD, normal for age and gender. 	  Neck: Normal JVP, no bruit.   Eyes: Conjunctiva clear, Extra Ocular muscles intact. No scleral icterus.  ENMT: Moist oral mucosa. No oral lesion.  Cardiovascular: Regular rate and rhythm S1 S2, No JVD, No murmurs.  Respiratory: Lungs clear to auscultation. No wheezes, rales or rhonchi.  Psychiatry: Alert and oriented x 3, Mood & affect appropriate.  Gastrointestinal:  Soft, Non-distended. Mild tenderness around umbilicus but no erythema.  Neurologic: Non-focal deficits.  Musculoskeletal/ extremities: Normal range of motion, No clubbing, cyanosis or edema.  Vascular: Peripheral pulses palpable bilaterally.  Skin/Integumen: No rashes, No ecchymoses, No cyanosis.

## 2022-11-05 NOTE — H&P ADULT - NSHPREVIEWOFSYSTEMS_GEN_ALL_CORE
REVIEW OF SYSTEMS:    CONSTITUTIONAL: No weakness, fevers or chills  EYES/ENT: No visual changes;  No vertigo or throat pain   NECK: No pain or stiffness  RESPIRATORY: No cough, wheezing, hemoptysis; No shortness of breath  CARDIOVASCULAR: No chest pain or palpitations  GASTROINTESTINAL: para umbilical pain 7/10 + nausea, ; No diarrhea or constipation. No melena or hematochezia.  GENITOURINARY: No dysuria, frequency or hematuria  NEUROLOGICAL: No numbness or weakness  SKIN: No itching, rashes

## 2022-11-05 NOTE — DISCHARGE NOTE PROVIDER - NSDCMRMEDTOKEN_GEN_ALL_CORE_FT
Ocuflox 0.3% ophthalmic solution: 2 drop(s) in each affected eye every 4 hours   ondansetron 4 mg oral disintegrating strip: 1 each orally every 8 hours, As Needed -for nausea    amoxicillin-clavulanate 875 mg-125 mg oral tablet: 1 tab(s) orally 2 times a day   dicyclomine 20 mg oral tablet: 1 tab(s) orally 2 times a day  oxycodone-acetaminophen 5 mg-325 mg oral tablet: 1 tab(s) orally 3 times a day -for severe pain MDD:3 tabs  Protonix 40 mg oral delayed release tablet: 1 tab(s) orally once a day  Tylenol 500 mg oral tablet: 1 tab(s) orally 3 times a day, As Needed -moderate pain

## 2022-11-05 NOTE — DISCHARGE NOTE PROVIDER - CARE PROVIDERS DIRECT ADDRESSES
[email or web address]

## 2022-11-05 NOTE — H&P ADULT - ASSESSMENT
21 year old female with PMH of gastritis and irritable bowel syndrome who presented to the ED with complaints of para umbilical pain for 2 days which has been progressively worsening.    #Paraumbilical pain and tenderness likely secondary to Omphalitis   - In ED she was evaluated by Surgery team, was noted to have purulent material and debris expressed upon deep probing of the umbilicus, likely 2/2 omphalitis.  - she remained HD stable with no evidence of Leukocytosis or fever  - CT Abd/Pelvis unremarkable  - Pt requesting to be dc home if possible  - Case discussed with Attending can be dc with PO Abx to complete 7 days with instruction to seek medical care if she develops fever/chills/drainge at the site  - Recommended patient cleanse the umbilicus with Q-tips and saline or hydrogen peroxide, and keep the umbilicus clean and dry    #Hx Gastritis  #Hx of IBS  - c/w PPI and Dicyclomine     DVT Ppx: N/A  GI Ppx: Pantoprazole 40mg PO QD   Diet: Regular  Activity: IAT  CODE: Full Code  Dispo: From Home    MED REC confirmed with pt at bedside   21 year old female with PMH of gastritis and irritable bowel syndrome who presented to the ED with complaints of para umbilical pain for 2 days which has been progressively worsening.    # Paraumbilical pain and tenderness likely secondary to Omphalitis   - In ED she was evaluated by Surgery team, was noted to have purulent material and debris expressed upon deep probing of the umbilicus, likely 2/2 omphalitis.  - she remained HD stable with no evidence of Leukocytosis or fever  - CT Abd/Pelvis unremarkable  - Pt requesting to be dc home if possible  - Case discussed with Attending can be dc with PO Abx to complete 7 days with instruction to seek medical care if she develops fever/chills/drainge at the site  - Recommended patient cleanse the umbilicus with Q-tips and saline or hydrogen peroxide, and keep the umbilicus clean and dry    # Hx Gastritis  # Hx of IBS  - c/w PPI and Dicyclomine     DVT Ppx: N/A  GI Ppx: Pantoprazole 40mg PO QD   Diet: Regular  Activity: IAT  CODE: Full Code  Dispo: From Home    MED REC confirmed with pt at bedside

## 2022-11-05 NOTE — DISCHARGE NOTE PROVIDER - HOSPITAL COURSE
21 year old female with PMH of gastritis and irritable bowel syndrome who presented to the ED with complaints of para umbilical pain for 2 days which has been progressively worsening. She admits to 7/10 nonradiating para umbilical pain, nonradiating and constant in nature exacerbating if touches by her pants. She admits to T 100.1 1x prior to admission which relieved by Motrin. She denies experiencing any similar episodes in the past, however states that she was diagnosed with a hernia (?hiatal) on endoscopy approximately 2-3 years ago. She also admits that she had naval piercing about 5 years ago which complicating with accidental tearing of her skin above her umbilicus and resolved and closed with no intervention. She has been tolerating her diet, last bowel movement today. She was evaluated by a gastroenterologist who prescribed Pantoprazole and Dicyclomine  and recommended a change in diet, which she states has only resulted in minimal improvement of her chronic nausea/vomiting. She denies chills, chest pain, shortness of breath, palpitations, recent infection.    In ED she was evaluated by Surgery team, was noted to have purulent material and debris expressed upon deep probing of the umbilicus, likely 2/2 omphalitis.  In ED she remained HD stable with no evidence of Leukocytosis or fever    CT Abdomen and Pelvis w/ Oral Cont and w/ IV Cont  1.  Fecalized distal ileum without definite evidence for bowel   obstruction, likely reflecting slow bowel transit.  2.  Appendix is unremarkable.      #Paraumbilical pain and tenderness likely secondary to Omphalitis   - In ED she was evaluated by Surgery team, was noted to have purulent material and debris expressed upon deep probing of the umbilicus, likely 2/2 omphalitis.  - she remained HD stable with no evidence of Leukocytosis or fever  - CT Abd/Pelvis unremarkable  - HD stable for dc with PO Abx Augmentin  to complete 7 days with instruction to seek medical care if she develops fever/chills/drainge at the site  - Recommended patient cleanse the umbilicus with Q-tips and saline or hydrogen peroxide, and keep the umbilicus clean and dry    #Hx Gastritis  #Hx of IBS  - c/w PPI and Dicyclomine

## 2022-11-05 NOTE — H&P ADULT - NSHPPHYSICALEXAM_GEN_ALL_CORE
GENERAL: NAD, Resting in bed  HEENT: NCAT  CHEST/LUNG: Clear to auscultation bilaterally; No wheezing or rubs.   HEART: Regular rate and rhythm; No murmurs, rubs, or gallops  ABDOMEN: Bowel sounds present; Soft, Mildly tender around umbilicus, Nondistended.   EXTREMITIES:  No clubbing, cyanosis, or edema  NERVOUS SYSTEM:  Alert & Oriented X3  MSK: FROM all 4 extremities, full and equal strength  SKIN: No rashes or lesions

## 2022-11-05 NOTE — CONSULT NOTE ADULT - SUBJECTIVE AND OBJECTIVE BOX
GENERAL SURGERY CONSULT NOTE    Patient: GREER FREEMAN , 21y ([birth year])Female   MRN: [id number]  Location: [facility] 015 A  Visit: 11-05-22 Inpatient  Date: 11-05-22 @ 03:54    HPI: 21 year old female with past medical history significant for gastritis and irritable bowel syndrome who presented to the ED with complaints of epigastric and umbilical pain since yesterday which has been progressively worsening. She denies experiencing any similar episodes in the past, however states that she was diagnosed with a hernia (?hiatal) on endoscopy approximately 2-3 years ago. She has been tolerating her diet, last bowel movement was 2 days ago, and she endorses 2-3 episodes daily for the past 2-3 months which she attributes to her gastritis. She was evaluated by a gastroenterologist who prescribed Pantoprazole and recommended a change in diet, which she states has only resulted in minimal improvement. She denies fever, chills, chest pain, shortness of breath, palpitations, recent infection.    PAST MEDICAL & SURGICAL HISTORY:  Irritable Bowel Syndrome  Gastritis  No significant past surgical history    Home Medications:  Pantoprazole  Dicyclomine     VITALS:  T(F): 98.3 (11-04-22 @ 18:47), Max: 98.3 (11-04-22 @ 18:47)  HR: 107 (11-04-22 @ 18:47) (107 - 107)  BP: 120/78 (11-04-22 @ 18:47) (120/78 - 120/78)  RR: 18 (11-04-22 @ 18:47) (18 - 18)  SpO2: 99% (11-04-22 @ 18:47) (99% - 99%)    PHYSICAL EXAM:  General: NAD, AAOx3, calm and cooperative  Cardiac: RRR S1, S2  Respiratory: normal respiratory effort, equal chest rise bilaterally   Abdomen: Soft, non-distended, tender to palpation in the umbilical area, purulent material and debris expressed upon deep probing of umbilicus   Skin: Warm/dry, normal color, no jaundice    MEDICATIONS  (STANDING):  None    MEDICATIONS  (PRN):  None    LAB/STUDIES:                        12.7   10.38 )-----------( 274      ( 04 Nov 2022 20:19 )             36.9     11-04    135  |  100  |  16  ----------------------------<  119<H>  4.5   |  23  |  0.5<L>    Ca    9.0      04 Nov 2022 20:19    TPro  7.4  /  Alb  4.7  /  TBili  <0.2  /  DBili  x   /  AST  18  /  ALT  12  /  AlkPhos  63  11-04    LIVER FUNCTIONS - ( 04 Nov 2022 20:19 )  Alb: 4.7 g/dL / Pro: 7.4 g/dL / ALK PHOS: 63 U/L / ALT: 12 U/L / AST: 18 U/L / GGT: x           Urinalysis Basic - ( 05 Nov 2022 01:00 )    Color: Light Yellow / Appearance: Clear / SG: >1.050 / pH: x  Gluc: x / Ketone: Negative  / Bili: Negative / Urobili: <2 mg/dL   Blood: x / Protein: Trace / Nitrite: Negative   Leuk Esterase: Negative / RBC: x / WBC x   Sq Epi: x / Non Sq Epi: x / Bacteria: x    IMAGING:  CT Abdomen and Pelvis w/ Oral Cont and w/ IV Cont (11.04.22 @ 23:29) >  IMPRESSION:  1.  Fecalized distal ileum without definite evidence for bowel   obstruction, likely reflecting slow bowel transit.  2.  Appendix is unremarkable.    < end of copied text >    ACCESS DEVICES:  [x] Peripheral IV   GENERAL SURGERY CONSULT NOTE    Patient: GREER FREEMAN , 21y ([birth year])Female   MRN: [id number]  Location: [facility] 015 A  Visit: 11-05-22 Inpatient  Date: 11-05-22 @ 03:54    HPI: 21 year old female with past medical history significant for gastritis and irritable bowel syndrome who presented to the ED with complaints of epigastric and umbilical pain since yesterday which has been progressively worsening. She denies experiencing any similar episodes in the past, however states that she was diagnosed with a hernia (?hiatal) on endoscopy approximately 2-3 years ago. She has been tolerating her diet, last bowel movement was 2 days ago, and she endorses 2-3 episodes daily for the past 2-3 months which she attributes to her gastritis. She was evaluated by a gastroenterologist who prescribed Pantoprazole and recommended a change in diet, which she states has only resulted in minimal improvement. She denies fever, chills, chest pain, shortness of breath, palpitations, recent infection.    PAST MEDICAL & SURGICAL HISTORY:  Irritable Bowel Syndrome  Gastritis  No significant past surgical history    Home Medications:  Pantoprazole  Dicyclomine     VITALS:  T(F): 98.3 (11-04-22 @ 18:47), Max: 98.3 (11-04-22 @ 18:47)  HR: 107 (11-04-22 @ 18:47) (107 - 107)  BP: 120/78 (11-04-22 @ 18:47) (120/78 - 120/78)  RR: 18 (11-04-22 @ 18:47) (18 - 18)  SpO2: 99% (11-04-22 @ 18:47) (99% - 99%)    PHYSICAL EXAM:  General: NAD, AAOx3, calm and cooperative  Cardiac: RRR S1, S2  Respiratory: normal respiratory effort, equal chest rise bilaterally   Abdomen: Soft, non-distended, tender to palpation in the umbilical area, purulent material and debris expressed upon deep probing of umbilicus   Skin: Warm/dry, normal color, no jaundice    MEDICATIONS  (STANDING):  None    MEDICATIONS  (PRN):  None    LAB/STUDIES:                     12.7   10.38 )-----------( 274      ( 04 Nov 2022 20:19 )             36.9     11-04  135  |  100  |  16  ----------------------------<  119<H>  4.5   |  23  |  0.5<L>    Ca    9.0      04 Nov 2022 20:19    TPro  7.4  /  Alb  4.7  /  TBili  <0.2  /  DBili  x   /  AST  18  /  ALT  12  /  AlkPhos  63  11-04    LIVER FUNCTIONS - ( 04 Nov 2022 20:19 )  Alb: 4.7 g/dL / Pro: 7.4 g/dL / ALK PHOS: 63 U/L / ALT: 12 U/L / AST: 18 U/L / GGT: x           Urinalysis Basic - ( 05 Nov 2022 01:00 )    Color: Light Yellow / Appearance: Clear / SG: >1.050 / pH: x  Gluc: x / Ketone: Negative  / Bili: Negative / Urobili: <2 mg/dL   Blood: x / Protein: Trace / Nitrite: Negative   Leuk Esterase: Negative / RBC: x / WBC x   Sq Epi: x / Non Sq Epi: x / Bacteria: x    IMAGING:  CT Abdomen and Pelvis w/ Oral Cont and w/ IV Cont (11.04.22 @ 23:29) >  IMPRESSION:  1.  Fecalized distal ileum without definite evidence for bowel obstruction, likely reflecting slow bowel transit.  2.  Appendix is unremarkable.    < end of copied text >    ACCESS DEVICES:  [x] Peripheral IV

## 2022-11-06 LAB
CULTURE RESULTS: SIGNIFICANT CHANGE UP
SPECIMEN SOURCE: SIGNIFICANT CHANGE UP

## 2022-11-11 DIAGNOSIS — L08.82 OMPHALITIS NOT OF NEWBORN: ICD-10-CM

## 2022-11-11 DIAGNOSIS — K58.9 IRRITABLE BOWEL SYNDROME WITHOUT DIARRHEA: ICD-10-CM

## 2024-07-29 NOTE — ED PROVIDER NOTE - DOMESTIC TRAVEL HIGH RISK QUESTION
[facility]  Urology Progress Note    Thais Woodward Patient Status:  Inpatient    1971 MRN [id number]   Location [facility] 3NW-A Attending Michelle Arnold MD   Hosp Day # 1 PCP Garrison Charles MD     Subjective:  Thais Woodward is a(n) 53 year old female.    S/P cystoscopy, right RGPG, right ureteral stent placement, ureteroscopy right 24 with Dr. Garner.      Current complaints: Started having left sided pain this AM.  Tmax 102.9 on , most recent temp 98.1.  +chills.  +dysuria, hematuria, irritative voiding symptoms.      Patient reports she passed a stone on     Objective:  General appearance: alert, appears stated age, and cooperative  /67 (BP Location: Left arm)   Pulse 58   Temp 98.1 °F (36.7 °C) (Oral)   Resp 18   Ht 5' 2\" (1.575 m)   Wt 147 lb (66.7 kg)   SpO2 94%   BMI 26.89 kg/m²   Lungs: non-labored respirations  Abdomen:soft, non-tender  Lab Results   Component Value Date    WBC 11.7 2024    WBC 11.7 2024    HGB 12.2 2024    HGB 12.2 2024    HCT 35.7 2024    HCT 35.7 2024    .0 2024    .0 2024    CREATSERUM 0.83 2024    BUN 15 2024     2024    K 3.6 2024     2024    CO2 28.0 2024     2024    CA 9.3 2024    ALB 4.7 2024    ALKPHO 116 2024    BILT 0.5 2024    TP 7.4 2024    AST 75 2024     2024    PTT 26.5 2024    INR 1.09 2024    PTP 14.1 2024       Hospital Encounter on 24   1. Blood Culture     Status: Abnormal (Preliminary result)    Collection Time: 24  1:21 PM    Specimen: Blood,peripheral   Result Value Ref Range    Blood Culture Result Positive N/A    Blood Smear Positive Blood Culture (A) N/A    Blood Smear Gram Negative Rods (A) N/A        XR OR - N/C    Result Date: 2024  CONCLUSION:  There has been interval placement of a right ureteral stent.   The visualized portion of the stent appears normally located.  The proximal portion of the stent is not included in the field of view.  Correlate with urologist's operative report.   LOCATION:  [facility]    Dictated by (CST): Tenzin Gray MD on 7/28/2024 at 9:23 PM     Finalized by (CST): Tenzin Gray MD on 7/28/2024 at 9:25 PM         Assessment:    FEVER, UTI/PYELO, RIGHT URETERAL CALCULUS, BILATERAL UROLITHIASIS  S/P cystoscopy, right RGPG, right ureteral stent placement, ureteroscopy right 7/28/24 with Dr. Garner.    Findings: The the UO was not inflamed or red consistent with a passed stone. We were able to easily cannulate the distal ureter and performed very gentle retrograde urogram noting some narrowness of the ureter approximately 5 cm to 6 cm above the ureteral orifice but no clear filling defect and and very mild hydronephrosis. Overt open-ended stent we placed a guidewire up into the kidney under fluoroscopy and then we looked with extort Yudelka ureteroscope up the right ureter we were able to look up about 5 to 6 cm but the ureter was very tight at this level and could not proceed any more proximally secondary to the stenosis. Of note we performed ureteroscopy under gravity and did not use pressure. Because of the ureteral stenosis and concern from her previous fevers we decided to abort and place a 6 x 24 double-J stent.     LEFT BACK PAIN  -started today    Afebrile  WBC 11.7  Serum creat 0.83  Serum calcium level 9.3  Urine culture 7/26/24: >100K CFU/mL: E. Coli  Urine culture 7/28/24: pending  Blood culture 7/28/24: prelim GNR  Blood culture PCR 7/28/24: E. Coli by PCR detected, CTX-M (ESBL gene) by PCR not detected  Blood culture 7/28/24: pending    Plan:    Check abdominal/pelvic CT scan without contrast to exclude left ureteral calculus.  NPO until CT scan done.    Check final culture results  Abx per attending  Follow labs, temp  Pain management  Patient to bring stone with her to her  follow-up visit with Dr. He.    Patient to follow-up with Dr. He after discharge to discuss next step.    Caity Pacheco PA-C  [facility]  Department of Urology  7/29/2024  5:40 AM    Addendum:  Abdominal/pelvic CT scan without contrast 7/29/24:  FINDINGS:    KIDNEYS:  A right ureteral stent has been placed when compared to 7/26/2024 CT.  There is perinephric stranding with trace free fluid surrounding the right kidney.  No hydronephrosis.  There is a 4 mm calcification in the midpole of the right kidney.    There is a 3 mm calcification midpole left kidney and 1 mm calcification mid to inferior pole left kidney.  Left kidney is negative for hydronephrosis.  No CT evidence for obstructing calculus.    ADRENALS:  Normal.  No mass or enlargement.    LIVER:  Stable scattered low-attenuation foci in the liver largest at the dome of the right lobe consistent with a cyst.    BILIARY:  No biliary ductal dilatation.    PANCREAS:  Unremarkable.    SPLEEN:  Normal caliber  AORTA/VASCULAR:  Normal.  No aneurysm.    RETROPERITONEUM:  Scattered normal caliber periaortic and retroperitoneal lymph nodes.  No enlarged adenopathy.    BOWEL/MESENTERY:  There is a large amount of stool in the left side of the colon.  Colonic diverticulosis.  Normal caliber appendix.    BONES:  No lytic or destructive process.  PELVIC ORGANS:  There is new free fluid in the cul de sac.  There is some free fluid superior to the dome of the urinary bladder.  There is wall thickening of the urinary bladder with a small amount of free air in the non dependent portion of the urinary   bladder likely iatrogenic.    LUNG BASES:         Atelectasis                   Impression   CONCLUSION:  A right ureteral stent has been placed since prior CT from 7/26/2024.     There is mild to moderate perinephric stranding and trace fluid about the right kidney.     Bilateral nephrolithiasis.     Left kidney is negative for hydronephrosis.     There is new free  fluid in the pelvis in the cul de sac and superior to the dome of the urinary bladder.     Circumferential wall thickening of the urinary bladder.  Recommend clinical correlation to exclude cystitis/pyelonephritis.     Fibroid uterus.     Results reviewed with patient, Dr. Garner.    Results also discussed with Dr. Giuseppe He. Dr. He reviewed CT scan.  Recommend cystoscopy, right RGPG, right ureteroscopy with laser lithotripsy, and ureteral stent exchange in 2 weeks while on abx - 60 minutes.  Surgery ticket to be placed.      Above discussed with patient.  Patient agree and understands.      ROBERT Wilkinson  Urology     No